# Patient Record
Sex: MALE | Race: WHITE | NOT HISPANIC OR LATINO
[De-identification: names, ages, dates, MRNs, and addresses within clinical notes are randomized per-mention and may not be internally consistent; named-entity substitution may affect disease eponyms.]

---

## 2019-01-10 PROBLEM — Z00.00 ENCOUNTER FOR PREVENTIVE HEALTH EXAMINATION: Status: ACTIVE | Noted: 2019-01-10

## 2019-06-20 ENCOUNTER — APPOINTMENT (OUTPATIENT)
Dept: VASCULAR SURGERY | Facility: CLINIC | Age: 79
End: 2019-06-20

## 2022-02-28 ENCOUNTER — APPOINTMENT (OUTPATIENT)
Dept: VASCULAR SURGERY | Facility: CLINIC | Age: 82
End: 2022-02-28
Payer: MEDICARE

## 2022-02-28 VITALS — HEART RATE: 77 BPM | SYSTOLIC BLOOD PRESSURE: 113 MMHG | DIASTOLIC BLOOD PRESSURE: 75 MMHG

## 2022-02-28 DIAGNOSIS — F17.200 NICOTINE DEPENDENCE, UNSPECIFIED, UNCOMPLICATED: ICD-10-CM

## 2022-02-28 DIAGNOSIS — I50.9 HEART FAILURE, UNSPECIFIED: ICD-10-CM

## 2022-02-28 DIAGNOSIS — Z83.438 FAMILY HISTORY OF OTHER DISORDER OF LIPOPROTEIN METABOLISM AND OTHER LIPIDEMIA: ICD-10-CM

## 2022-02-28 DIAGNOSIS — Z82.49 FAMILY HISTORY OF ISCHEMIC HEART DISEASE AND OTHER DISEASES OF THE CIRCULATORY SYSTEM: ICD-10-CM

## 2022-02-28 DIAGNOSIS — H91.90 UNSPECIFIED HEARING LOSS, UNSPECIFIED EAR: ICD-10-CM

## 2022-02-28 DIAGNOSIS — I80.3 PHLEBITIS AND THROMBOPHLEBITIS OF LOWER EXTREMITIES, UNSPECIFIED: ICD-10-CM

## 2022-02-28 DIAGNOSIS — I10 ESSENTIAL (PRIMARY) HYPERTENSION: ICD-10-CM

## 2022-02-28 DIAGNOSIS — I48.91 UNSPECIFIED ATRIAL FIBRILLATION: ICD-10-CM

## 2022-02-28 PROCEDURE — 93971 EXTREMITY STUDY: CPT

## 2022-02-28 PROCEDURE — 99213 OFFICE O/P EST LOW 20 MIN: CPT

## 2022-02-28 RX ORDER — FUROSEMIDE 20 MG/1
20 TABLET ORAL
Qty: 90 | Refills: 0 | Status: ACTIVE | COMMUNITY
Start: 2021-03-24

## 2022-02-28 RX ORDER — DIGOXIN 125 UG/1
125 TABLET ORAL
Qty: 90 | Refills: 0 | Status: ACTIVE | COMMUNITY
Start: 2021-03-24

## 2022-02-28 RX ORDER — UMECLIDINIUM BROMIDE AND VILANTEROL TRIFENATATE 62.5; 25 UG/1; UG/1
62.5-25 POWDER RESPIRATORY (INHALATION)
Qty: 60 | Refills: 0 | Status: ACTIVE | COMMUNITY
Start: 2021-06-09

## 2022-02-28 RX ORDER — RIVAROXABAN 15 MG/1
15 TABLET, FILM COATED ORAL
Qty: 90 | Refills: 0 | Status: ACTIVE | COMMUNITY
Start: 2021-10-14

## 2022-02-28 RX ORDER — METOPROLOL SUCCINATE 25 MG/1
25 TABLET, EXTENDED RELEASE ORAL
Qty: 270 | Refills: 0 | Status: ACTIVE | COMMUNITY
Start: 2021-08-10

## 2022-02-28 RX ORDER — CLOTRIMAZOLE 10 MG/G
1 CREAM TOPICAL
Qty: 15 | Refills: 0 | Status: ACTIVE | COMMUNITY
Start: 2021-10-07

## 2022-02-28 RX ORDER — FAMOTIDINE 20 MG/1
20 TABLET, FILM COATED ORAL
Qty: 180 | Refills: 0 | Status: ACTIVE | COMMUNITY
Start: 2021-09-28

## 2022-02-28 NOTE — REVIEW OF SYSTEMS
[Fever] : no fever [Chills] : no chills [Lower Ext Edema] : lower extremity edema [Limb Pain] : limb pain [Limb Swelling] : limb swelling

## 2022-02-28 NOTE — REASON FOR VISIT
[Follow-Up: _____] : a [unfilled] follow-up visit [Family Member] : family member [FreeTextEntry1] : left lower extremity edema

## 2022-02-28 NOTE — PHYSICAL EXAM
[JVD] : no jugular venous distention  [Normal Breath Sounds] : Normal breath sounds [Normal Rate and Rhythm] : normal rate and rhythm [2+] : left 2+ [Ankle Swelling (On Exam)] : present [Varicose Veins Of Lower Extremities] : present [Varicose Veins Of The Left Leg] : of the left leg [] : of the left leg [Ankle Swelling On The Left] : moderate [Alert] : alert [Oriented to Person] : oriented to person [Oriented to Place] : oriented to place [Oriented to Time] : oriented to time [de-identified] : Awake and Alert [de-identified] : varicose veins left medial calf > 3mm [de-identified] : Appropriate

## 2022-02-28 NOTE — ASSESSMENT
[FreeTextEntry1] : 80 yo male with moderate left leg swelling and varicose veins. He underwent a venous duplex which confirmed a markedly enlarged GSV with insufficiency. He is an excellent candidate for an ablation of his left GSV. The risks and benefits were discussed with the patient and his daughter. He agrees to proceed. He will continue to wear a compression stocking until the time of the procedure.

## 2022-02-28 NOTE — HISTORY OF PRESENT ILLNESS
[FreeTextEntry1] : 82 yo male previously seen by Dr. Gibbs returns in follow up. The patient reports continued pain and swelling in his left lower extremity despite wearing a compression stocking. He reports that he has a dull achy pain that keeps him up at night. He reports that he was scheduled for treatment of his leak vein by he was hospitalized and did not reschedule. He denies a history of DVT. He has a history of SVT. He is on Xarelto for atrial fibrillation.

## 2022-02-28 NOTE — CONSULT LETTER
[Dear  ___] : Dear  [unfilled], [Consult Letter:] : I had the pleasure of evaluating your patient, [unfilled]. [Please see my note below.] : Please see my note below. [Consult Closing:] : Thank you very much for allowing me to participate in the care of this patient.  If you have any questions, please do not hesitate to contact me. [Sincerely,] : Sincerely, [FreeTextEntry2] : Doug Canchola [FreeTextEntry3] : Roman Tripp MD, RPVI\par Chief, Vascular Surgery\par

## 2022-03-28 ENCOUNTER — APPOINTMENT (OUTPATIENT)
Dept: VASCULAR SURGERY | Facility: CLINIC | Age: 82
End: 2022-03-28
Payer: MEDICARE

## 2022-03-28 VITALS — DIASTOLIC BLOOD PRESSURE: 78 MMHG | SYSTOLIC BLOOD PRESSURE: 132 MMHG | HEART RATE: 55 BPM

## 2022-03-28 VITALS — DIASTOLIC BLOOD PRESSURE: 84 MMHG | SYSTOLIC BLOOD PRESSURE: 142 MMHG | HEART RATE: 81 BPM

## 2022-03-28 PROCEDURE — 36475 ENDOVENOUS RF 1ST VEIN: CPT | Mod: LT

## 2022-03-28 NOTE — ADDENDUM
[FreeTextEntry1] : He tolerated the procedure. He verbalized understanding of the post procedure instructions.\par \par He will follow up in 1 week.\par

## 2022-03-28 NOTE — PROCEDURE
[FreeTextEntry1] : RFA left GSV [FreeTextEntry2] : Symptomatic Venous Insufficiency and Varicose Veins [FreeTextEntry3] : The patient was seen in the waiting room where the consent was obtained. He was then taken to the procedure room where a timeout was called to verify his identity and the laterality of the procedure. The patient's left leg was then interrogated under ultrasound and an appropriate place for cannulation was identified. His left leg was then prepped and draped in the standard fashion. Under ultrasound guidance the patient was given an injection of 1% plain lidocaine in the distal thigh. Access was then obtained with a 7 FR sheath in the standard fashion. Catheter was placed to the SFJ and withdrawn 3.0 cm from the junction. Patient was then given tumescence around the saphenous vein under ultrasound guidance. The ablation was then performed in the standard fashion. Steri strips were applied to catheter insertion site. Leg was wrapped in kerlix and an ace wrap. Patient was discharged in stable condition.\par

## 2022-04-06 ENCOUNTER — APPOINTMENT (OUTPATIENT)
Dept: VASCULAR SURGERY | Facility: CLINIC | Age: 82
End: 2022-04-06
Payer: MEDICARE

## 2022-04-06 DIAGNOSIS — I83.812 VARICOSE VEINS OF LEFT LOWER EXTREMITY WITH PAIN: ICD-10-CM

## 2022-04-06 DIAGNOSIS — I87.2 VENOUS INSUFFICIENCY (CHRONIC) (PERIPHERAL): ICD-10-CM

## 2022-04-06 PROCEDURE — 99212 OFFICE O/P EST SF 10 MIN: CPT

## 2022-04-06 PROCEDURE — 93971 EXTREMITY STUDY: CPT

## 2022-04-06 NOTE — PHYSICAL EXAM
[Ankle Swelling (On Exam)] : present [Ankle Swelling On The Left] : of the left ankle [Varicose Veins Of Lower Extremities] : present [Varicose Veins Of The Left Leg] : of the left leg [] : of the left leg [Ankle Swelling On The Right] : mild [de-identified] : Awake and Alert [de-identified] : catheter insertion site with ecchymosis, No erythema [de-identified] : No gross motor or sensory deficits [de-identified] : Appropriate

## 2022-04-06 NOTE — ASSESSMENT
[FreeTextEntry1] : 82 yo male s/p ablation of the GSV. He is doing well post procedure. He underwent a venous duplex which demonstrated an appropriately ablated GSV. His varicose veins have decompressed. His leg pain and swelling have improved. He will follow up on a PRN basis.

## 2022-04-06 NOTE — HISTORY OF PRESENT ILLNESS
[FreeTextEntry1] : 80 yo male returns in follow up. He is s/p an RFA of the left GSV. He reports that he is doing well. He denies fever or chills. He reports that his left lower extremity edema and leg pain have resolved post procedure.

## 2022-04-07 PROBLEM — I83.812 VARICOSE VEINS OF LEFT LOWER EXTREMITY WITH PAIN: Status: ACTIVE | Noted: 2022-02-28

## 2022-04-07 PROBLEM — I87.2 VENOUS INSUFFICIENCY OF LEFT LOWER EXTREMITY: Status: ACTIVE | Noted: 2022-02-28

## 2024-09-11 ENCOUNTER — APPOINTMENT (OUTPATIENT)
Dept: VASCULAR SURGERY | Facility: CLINIC | Age: 84
End: 2024-09-11
Payer: MEDICARE

## 2024-09-11 VITALS — BODY MASS INDEX: 26.52 KG/M2 | WEIGHT: 165 LBS | HEIGHT: 66 IN

## 2024-09-11 DIAGNOSIS — I83.812 VARICOSE VEINS OF LEFT LOWER EXTREMITY WITH PAIN: ICD-10-CM

## 2024-09-11 DIAGNOSIS — I87.2 VENOUS INSUFFICIENCY (CHRONIC) (PERIPHERAL): ICD-10-CM

## 2024-09-11 PROCEDURE — 99213 OFFICE O/P EST LOW 20 MIN: CPT

## 2024-09-14 RX ORDER — MELOXICAM 10 MG/1
CAPSULE ORAL
Refills: 0 | Status: ACTIVE | COMMUNITY

## 2024-09-14 NOTE — HISTORY OF PRESENT ILLNESS
[FreeTextEntry1] : 80 yo male returns in follow up. He is s/p an RFA of the left GSV. He reports that he is doing well. He denies fever or chills. He reports that his left lower extremity edema and leg pain have resolved post procedure.  [de-identified] : 83 year old male returns in follow up for a chief complaint of left lower extremity pain and swelling. He is s/p an RFA of the left GSV in 2022. The patient reports three weeks ago, he developed pain and swelling behind his left knee, radiating to the anterior aspect of knee. The daughter was concerned for a blood clot, so [facility]  which was negative for a DVT. He was then instructed to make an appointment for vascular follow up. The patient reports that the pain and swelling has have resolved. The patient is walking without discomfort. The patient is on Xarelto. Denies hx of DM.  The patient is accompanied by his daughter.

## 2024-09-14 NOTE — PHYSICAL EXAM
[Normal Breath Sounds] : Normal breath sounds [2+] : left 2+ [Ankle Swelling (On Exam)] : present [Ankle Swelling On The Left] : of the left ankle [Varicose Veins Of Lower Extremities] : present [Varicose Veins Of The Left Leg] : of the left leg [] : of the left leg [Ankle Swelling On The Right] : mild [Alert] : alert [Oriented to Person] : oriented to person [Oriented to Place] : oriented to place [Oriented to Time] : oriented to time [JVD] : no jugular venous distention  [de-identified] : Awake and Alert [de-identified] : varicose veins in the medial aspect of his left knee [de-identified] : No gross motor or sensory deficits [de-identified] : Appropriate

## 2024-09-14 NOTE — END OF VISIT
[FreeTextEntry3] :  All medical record entries made by the coraibe were at my, RAJ OSEI, direction and personally dictated by me on 9/11/2024. I have reviewed the chart and agree that the record accurately reflects my personal performance of the history, physical exam, assessment, and plan. I have also personally directed, reviewed, and agreed with the chart.

## 2024-09-14 NOTE — REVIEW OF SYSTEMS
[Fever] : no fever [Chills] : no chills [Leg Claudication] : no intermittent leg claudication [Lower Ext Edema] : no extremity edema [Limb Pain] : no limb pain [Limb Swelling] : no limb swelling [Skin Wound] : no skin wound

## 2024-09-14 NOTE — HISTORY OF PRESENT ILLNESS
[FreeTextEntry1] : 80 yo male returns in follow up. He is s/p an RFA of the left GSV. He reports that he is doing well. He denies fever or chills. He reports that his left lower extremity edema and leg pain have resolved post procedure.  [de-identified] : 83 year old male returns in follow up for a chief complaint of left lower extremity pain and swelling. He is s/p an RFA of the left GSV in 2022. The patient reports three weeks ago, he developed pain and swelling behind his left knee, radiating to the anterior aspect of knee. The daughter was concerned for a blood clot, so [facility]  which was negative for a DVT. He was then instructed to make an appointment for vascular follow up. The patient reports that the pain and swelling has have resolved. The patient is walking without discomfort. The patient is on Xarelto. Denies hx of DM.  The patient is accompanied by his daughter.

## 2024-09-14 NOTE — PHYSICAL EXAM
Group Topic:  Monitoring Safety and Relapse    Date: 6/1/2020  Start Time:  1:00 PM  End Time:  4:00 PM  Facilitators: Rafa Currie LPC    Number in attendance: 8    This visit is being performed via video to discuss   Clinician Location: No information on file.    Pt is in [place] and pt's identity has been established.   Pt understands that we are limiting office visits due to the coronavirus pandemic and was informed that consent to treat includes permission to submit charges to pt's insurance. It was also shared that without being seen and evaluated in person, there is a risk that the information and/or assessment may be incomplete or inaccurate.  60 minutes were spent in this encounter.     Rafa Currie LPC      Types of Safety Concerns: None  Physical Concerns: None  Use of Street Drugs: No  Taking Medication as Prescribed?: Yes     [Normal Breath Sounds] : Normal breath sounds [2+] : left 2+ [Ankle Swelling (On Exam)] : present [Ankle Swelling On The Left] : of the left ankle [Varicose Veins Of Lower Extremities] : present [Varicose Veins Of The Left Leg] : of the left leg [] : of the left leg [Ankle Swelling On The Right] : mild [Alert] : alert [Oriented to Person] : oriented to person [Oriented to Place] : oriented to place [Oriented to Time] : oriented to time [JVD] : no jugular venous distention  [de-identified] : Awake and Alert [de-identified] : varicose veins in the medial aspect of his left knee [de-identified] : No gross motor or sensory deficits [de-identified] : Appropriate

## 2024-09-14 NOTE — ASSESSMENT
[FreeTextEntry1] : 82 yo male s/p ablation of the left GSV in 2022.  The patient he developed pain and swelling behind his left knee, radiating to the anterior aspect of knee three weeks ago. He was seen in the [facility] ED, where he underwent an ultrasound which was negative for DVT.  The patient reports the pain and swelling has now resolved. He has normal pulses on exam. We discussed undergoing a venous duplex to evaluate for venous insufficiency. The patient and his daughter believe that it is unnecessary at this time   He will follow up immediately if he develops  recurrent pain or swelling, All of their questions answered.  Continue taking the Xarelto.

## 2024-09-14 NOTE — ADDENDUM
Subjective     Patient ID: Richard Aceves is a 24 y.o. male.    Chief Complaint: Annual Exam    HPI  24 y.o. Male here for annual exam.      Vaccines: Influenza (2021); Tetanus (2021)  Eye exam: current     Exercise: weights/cardio  Diet: regular      Past Medical History:  No date: ADHD (attention deficit hyperactivity disorder)  History reviewed. No pertinent surgical history.  Social History    Socioeconomic History      Marital status: Single    Occupational History      Occupation: Student    Tobacco Use      Smoking status: Never Smoker      Smokeless tobacco: Never Used    Substance and Sexual Activity      Alcohol use: Never      Drug use: Never      Sexual activity: Yes        Partners: Female     Review of patient's allergies indicates:  No Known Allergies  Review of Systems   Constitutional:  Positive for activity change. Negative for appetite change, chills, diaphoresis, fatigue, fever and unexpected weight change.   HENT:  Negative for nasal congestion, hearing loss, mouth sores, postnasal drip, rhinorrhea, sinus pressure/congestion, sneezing, sore throat, trouble swallowing and voice change.    Eyes:  Negative for discharge, itching and visual disturbance.   Respiratory:  Negative for cough, chest tightness, shortness of breath and wheezing.    Cardiovascular:  Negative for chest pain, palpitations and leg swelling.   Gastrointestinal:  Negative for abdominal pain, blood in stool, constipation, diarrhea, nausea and vomiting.   Endocrine: Negative for cold intolerance, heat intolerance, polydipsia and polyuria.   Genitourinary:  Negative for difficulty urinating, dysuria, flank pain, hematuria and urgency.   Musculoskeletal:  Negative for arthralgias, back pain, joint swelling, myalgias and neck pain.   Integumentary:  Negative for rash and wound.   Allergic/Immunologic: Negative for environmental allergies and food allergies.   Neurological:  Positive for headaches. Negative for dizziness, tremors,  [FreeTextEntry1] :  Documented by Lamar Valdivia acting as a scribe for RAJ OSEI on 9/11/2024. seizures, syncope and weakness.   Hematological:  Negative for adenopathy. Does not bruise/bleed easily.   Psychiatric/Behavioral:  Positive for decreased concentration. Negative for confusion, dysphoric mood, self-injury, sleep disturbance and suicidal ideas. The patient is not nervous/anxious.           Objective     Physical Exam  Constitutional:       General: He is not in acute distress.     Appearance: Normal appearance. He is well-developed. He is not ill-appearing, toxic-appearing or diaphoretic.   HENT:      Head: Normocephalic and atraumatic.      Right Ear: External ear normal.      Left Ear: External ear normal.      Nose: Nose normal.      Mouth/Throat:      Pharynx: No oropharyngeal exudate.   Eyes:      General: No scleral icterus.        Right eye: No discharge.         Left eye: No discharge.      Extraocular Movements: Extraocular movements intact.      Conjunctiva/sclera: Conjunctivae normal.      Pupils: Pupils are equal, round, and reactive to light.   Neck:      Thyroid: No thyromegaly.      Vascular: No JVD.   Cardiovascular:      Rate and Rhythm: Normal rate and regular rhythm.      Pulses: Normal pulses.      Heart sounds: Normal heart sounds. No murmur heard.  Pulmonary:      Effort: Pulmonary effort is normal. No respiratory distress.      Breath sounds: Normal breath sounds. No wheezing or rales.   Abdominal:      General: Bowel sounds are normal. There is no distension.      Palpations: Abdomen is soft.      Tenderness: There is no abdominal tenderness. There is no right CVA tenderness, left CVA tenderness, guarding or rebound.   Musculoskeletal:      Cervical back: Normal range of motion and neck supple. No rigidity.      Right lower leg: No edema.      Left lower leg: No edema.   Lymphadenopathy:      Cervical: No cervical adenopathy.   Skin:     General: Skin is warm and dry.      Capillary Refill: Capillary refill takes less than 2 seconds.      Coloration: Skin is not pale.       Findings: No rash.   Neurological:      General: No focal deficit present.      Mental Status: He is alert and oriented to person, place, and time. Mental status is at baseline.      Cranial Nerves: No cranial nerve deficit.      Sensory: No sensory deficit.      Motor: No weakness.      Coordination: Coordination normal.      Gait: Gait normal.      Deep Tendon Reflexes: Reflexes normal.   Psychiatric:         Mood and Affect: Mood normal.         Behavior: Behavior normal.         Thought Content: Thought content normal.         Judgment: Judgment normal.            Assessment and Plan     1. Annual physical exam  -     CBC auto differential; Future; Expected date: 05/31/2024  -     Comprehensive metabolic panel; Future; Expected date: 05/31/2024  -     Hemoglobin A1c; Future; Expected date: 05/31/2024  -     Lipid panel; Future; Expected date: 05/31/2024  -     TSH; Future; Expected date: 05/31/2024  -     Urinalysis; Future; Expected date: 05/31/2024    2. Attention deficit hyperactivity disorder (ADHD), unspecified ADHD type       Blood work ordered      ADD- stable on Focalin, followed by [facility]     F/u in 1 yr

## 2024-10-07 ENCOUNTER — APPOINTMENT (OUTPATIENT)
Dept: PODIATRY | Facility: CLINIC | Age: 84
End: 2024-10-07